# Patient Record
Sex: MALE | Race: WHITE | NOT HISPANIC OR LATINO | URBAN - METROPOLITAN AREA
[De-identification: names, ages, dates, MRNs, and addresses within clinical notes are randomized per-mention and may not be internally consistent; named-entity substitution may affect disease eponyms.]

---

## 2017-06-28 ENCOUNTER — APPOINTMENT (OUTPATIENT)
Dept: PHYSICAL THERAPY | Facility: CLINIC | Age: 77
End: 2017-06-28
Payer: MEDICARE

## 2017-06-28 PROCEDURE — G8991 OTHER PT/OT GOAL STATUS: HCPCS

## 2017-06-28 PROCEDURE — 97162 PT EVAL MOD COMPLEX 30 MIN: CPT

## 2017-06-28 PROCEDURE — 97140 MANUAL THERAPY 1/> REGIONS: CPT

## 2017-06-28 PROCEDURE — G8990 OTHER PT/OT CURRENT STATUS: HCPCS

## 2017-07-03 ENCOUNTER — APPOINTMENT (OUTPATIENT)
Dept: PHYSICAL THERAPY | Facility: CLINIC | Age: 77
End: 2017-07-03
Payer: MEDICARE

## 2017-07-03 PROCEDURE — 97110 THERAPEUTIC EXERCISES: CPT

## 2017-07-03 PROCEDURE — 97140 MANUAL THERAPY 1/> REGIONS: CPT

## 2017-07-03 PROCEDURE — 97112 NEUROMUSCULAR REEDUCATION: CPT

## 2017-07-06 ENCOUNTER — APPOINTMENT (OUTPATIENT)
Dept: PHYSICAL THERAPY | Facility: CLINIC | Age: 77
End: 2017-07-06
Payer: MEDICARE

## 2017-07-06 PROCEDURE — 97110 THERAPEUTIC EXERCISES: CPT

## 2017-07-06 PROCEDURE — 97112 NEUROMUSCULAR REEDUCATION: CPT

## 2017-07-06 PROCEDURE — 97140 MANUAL THERAPY 1/> REGIONS: CPT

## 2017-07-17 ENCOUNTER — APPOINTMENT (OUTPATIENT)
Dept: PHYSICAL THERAPY | Facility: CLINIC | Age: 77
End: 2017-07-17
Payer: MEDICARE

## 2017-07-19 ENCOUNTER — APPOINTMENT (OUTPATIENT)
Dept: PHYSICAL THERAPY | Facility: CLINIC | Age: 77
End: 2017-07-19
Payer: MEDICARE

## 2017-07-21 ENCOUNTER — APPOINTMENT (OUTPATIENT)
Dept: PHYSICAL THERAPY | Facility: CLINIC | Age: 77
End: 2017-07-21
Payer: MEDICARE

## 2017-07-21 PROCEDURE — 97140 MANUAL THERAPY 1/> REGIONS: CPT

## 2017-07-21 PROCEDURE — 97110 THERAPEUTIC EXERCISES: CPT

## 2017-07-21 PROCEDURE — 97112 NEUROMUSCULAR REEDUCATION: CPT

## 2017-07-24 ENCOUNTER — APPOINTMENT (OUTPATIENT)
Dept: PHYSICAL THERAPY | Facility: CLINIC | Age: 77
End: 2017-07-24
Payer: MEDICARE

## 2017-07-24 PROCEDURE — 97112 NEUROMUSCULAR REEDUCATION: CPT

## 2017-07-24 PROCEDURE — 97140 MANUAL THERAPY 1/> REGIONS: CPT

## 2017-07-24 PROCEDURE — 97110 THERAPEUTIC EXERCISES: CPT

## 2017-07-24 PROCEDURE — 97014 ELECTRIC STIMULATION THERAPY: CPT

## 2017-07-26 ENCOUNTER — APPOINTMENT (OUTPATIENT)
Dept: PHYSICAL THERAPY | Facility: CLINIC | Age: 77
End: 2017-07-26
Payer: MEDICARE

## 2017-07-26 PROCEDURE — G8990 OTHER PT/OT CURRENT STATUS: HCPCS

## 2017-07-26 PROCEDURE — G8991 OTHER PT/OT GOAL STATUS: HCPCS

## 2017-07-26 PROCEDURE — 97140 MANUAL THERAPY 1/> REGIONS: CPT

## 2017-07-26 PROCEDURE — 97110 THERAPEUTIC EXERCISES: CPT

## 2017-07-26 PROCEDURE — 97112 NEUROMUSCULAR REEDUCATION: CPT

## 2017-07-31 ENCOUNTER — APPOINTMENT (OUTPATIENT)
Dept: PHYSICAL THERAPY | Facility: CLINIC | Age: 77
End: 2017-07-31
Payer: MEDICARE

## 2017-07-31 PROCEDURE — 97014 ELECTRIC STIMULATION THERAPY: CPT

## 2017-07-31 PROCEDURE — 97112 NEUROMUSCULAR REEDUCATION: CPT

## 2017-07-31 PROCEDURE — 97110 THERAPEUTIC EXERCISES: CPT

## 2017-08-07 ENCOUNTER — APPOINTMENT (OUTPATIENT)
Dept: PHYSICAL THERAPY | Facility: CLINIC | Age: 77
End: 2017-08-07
Payer: MEDICARE

## 2017-08-07 PROCEDURE — 97014 ELECTRIC STIMULATION THERAPY: CPT

## 2017-08-07 PROCEDURE — 97140 MANUAL THERAPY 1/> REGIONS: CPT

## 2017-08-07 PROCEDURE — 97112 NEUROMUSCULAR REEDUCATION: CPT

## 2017-08-07 PROCEDURE — 97110 THERAPEUTIC EXERCISES: CPT

## 2017-08-09 ENCOUNTER — APPOINTMENT (OUTPATIENT)
Dept: PHYSICAL THERAPY | Facility: CLINIC | Age: 77
End: 2017-08-09
Payer: MEDICARE

## 2017-08-09 PROCEDURE — 97112 NEUROMUSCULAR REEDUCATION: CPT

## 2017-08-09 PROCEDURE — 97110 THERAPEUTIC EXERCISES: CPT

## 2017-08-14 ENCOUNTER — APPOINTMENT (OUTPATIENT)
Dept: PHYSICAL THERAPY | Facility: CLINIC | Age: 77
End: 2017-08-14
Payer: MEDICARE

## 2017-08-14 PROCEDURE — 97110 THERAPEUTIC EXERCISES: CPT

## 2017-08-14 PROCEDURE — 97112 NEUROMUSCULAR REEDUCATION: CPT

## 2017-08-16 ENCOUNTER — APPOINTMENT (OUTPATIENT)
Dept: PHYSICAL THERAPY | Facility: CLINIC | Age: 77
End: 2017-08-16
Payer: MEDICARE

## 2017-08-16 PROCEDURE — G8991 OTHER PT/OT GOAL STATUS: HCPCS

## 2017-08-16 PROCEDURE — 97112 NEUROMUSCULAR REEDUCATION: CPT

## 2017-08-16 PROCEDURE — G8992 OTHER PT/OT  D/C STATUS: HCPCS

## 2017-08-16 PROCEDURE — 97110 THERAPEUTIC EXERCISES: CPT

## 2017-10-23 ENCOUNTER — APPOINTMENT (OUTPATIENT)
Dept: PHYSICAL THERAPY | Facility: CLINIC | Age: 77
End: 2017-10-23
Payer: MEDICARE

## 2017-10-23 PROCEDURE — 97162 PT EVAL MOD COMPLEX 30 MIN: CPT

## 2017-10-23 PROCEDURE — G8990 OTHER PT/OT CURRENT STATUS: HCPCS

## 2017-10-23 PROCEDURE — 97014 ELECTRIC STIMULATION THERAPY: CPT

## 2017-10-23 PROCEDURE — G8991 OTHER PT/OT GOAL STATUS: HCPCS

## 2017-10-26 ENCOUNTER — APPOINTMENT (OUTPATIENT)
Dept: PHYSICAL THERAPY | Facility: CLINIC | Age: 77
End: 2017-10-26
Payer: MEDICARE

## 2017-10-26 PROCEDURE — 97112 NEUROMUSCULAR REEDUCATION: CPT

## 2017-10-26 PROCEDURE — 97110 THERAPEUTIC EXERCISES: CPT

## 2017-10-26 PROCEDURE — 97140 MANUAL THERAPY 1/> REGIONS: CPT

## 2017-10-30 ENCOUNTER — APPOINTMENT (OUTPATIENT)
Dept: PHYSICAL THERAPY | Facility: CLINIC | Age: 77
End: 2017-10-30
Payer: MEDICARE

## 2017-10-30 PROCEDURE — 97110 THERAPEUTIC EXERCISES: CPT

## 2017-10-30 PROCEDURE — 97112 NEUROMUSCULAR REEDUCATION: CPT

## 2017-10-30 PROCEDURE — 97140 MANUAL THERAPY 1/> REGIONS: CPT

## 2017-11-02 ENCOUNTER — APPOINTMENT (OUTPATIENT)
Dept: PHYSICAL THERAPY | Facility: CLINIC | Age: 77
End: 2017-11-02
Payer: MEDICARE

## 2017-11-02 PROCEDURE — 97112 NEUROMUSCULAR REEDUCATION: CPT

## 2017-11-02 PROCEDURE — 97014 ELECTRIC STIMULATION THERAPY: CPT

## 2017-11-02 PROCEDURE — 97110 THERAPEUTIC EXERCISES: CPT

## 2017-11-02 PROCEDURE — 97140 MANUAL THERAPY 1/> REGIONS: CPT

## 2017-11-06 ENCOUNTER — APPOINTMENT (OUTPATIENT)
Dept: PHYSICAL THERAPY | Facility: CLINIC | Age: 77
End: 2017-11-06
Payer: MEDICARE

## 2017-11-06 PROCEDURE — 97140 MANUAL THERAPY 1/> REGIONS: CPT

## 2017-11-09 ENCOUNTER — APPOINTMENT (OUTPATIENT)
Dept: PHYSICAL THERAPY | Facility: CLINIC | Age: 77
End: 2017-11-09
Payer: MEDICARE

## 2017-11-09 PROCEDURE — 97014 ELECTRIC STIMULATION THERAPY: CPT

## 2017-11-09 PROCEDURE — 97140 MANUAL THERAPY 1/> REGIONS: CPT

## 2017-11-09 PROCEDURE — 97112 NEUROMUSCULAR REEDUCATION: CPT

## 2017-11-09 PROCEDURE — 97110 THERAPEUTIC EXERCISES: CPT

## 2017-11-13 ENCOUNTER — APPOINTMENT (OUTPATIENT)
Dept: PHYSICAL THERAPY | Facility: CLINIC | Age: 77
End: 2017-11-13
Payer: MEDICARE

## 2017-11-13 PROCEDURE — 97014 ELECTRIC STIMULATION THERAPY: CPT

## 2017-11-13 PROCEDURE — 97140 MANUAL THERAPY 1/> REGIONS: CPT

## 2017-11-13 PROCEDURE — 97112 NEUROMUSCULAR REEDUCATION: CPT

## 2017-11-13 PROCEDURE — 97110 THERAPEUTIC EXERCISES: CPT

## 2017-11-16 ENCOUNTER — APPOINTMENT (OUTPATIENT)
Dept: PHYSICAL THERAPY | Facility: CLINIC | Age: 77
End: 2017-11-16
Payer: MEDICARE

## 2017-11-16 PROCEDURE — 97112 NEUROMUSCULAR REEDUCATION: CPT

## 2017-11-16 PROCEDURE — 97014 ELECTRIC STIMULATION THERAPY: CPT

## 2017-11-20 ENCOUNTER — APPOINTMENT (OUTPATIENT)
Dept: PHYSICAL THERAPY | Facility: CLINIC | Age: 77
End: 2017-11-20
Payer: MEDICARE

## 2017-11-20 PROCEDURE — 97110 THERAPEUTIC EXERCISES: CPT

## 2017-11-20 PROCEDURE — 97112 NEUROMUSCULAR REEDUCATION: CPT

## 2017-11-20 PROCEDURE — 97140 MANUAL THERAPY 1/> REGIONS: CPT

## 2017-11-27 ENCOUNTER — APPOINTMENT (OUTPATIENT)
Dept: PHYSICAL THERAPY | Facility: CLINIC | Age: 77
End: 2017-11-27
Payer: MEDICARE

## 2017-11-27 PROCEDURE — 97140 MANUAL THERAPY 1/> REGIONS: CPT

## 2017-11-27 PROCEDURE — G8990 OTHER PT/OT CURRENT STATUS: HCPCS

## 2017-11-27 PROCEDURE — G8991 OTHER PT/OT GOAL STATUS: HCPCS

## 2017-11-30 ENCOUNTER — APPOINTMENT (OUTPATIENT)
Dept: PHYSICAL THERAPY | Facility: CLINIC | Age: 77
End: 2017-11-30
Payer: MEDICARE

## 2019-01-14 ENCOUNTER — TRANSCRIBE ORDERS (OUTPATIENT)
Dept: ADMINISTRATIVE | Facility: HOSPITAL | Age: 79
End: 2019-01-14

## 2019-01-14 DIAGNOSIS — R42 VERTIGO: Primary | ICD-10-CM

## 2019-10-10 ENCOUNTER — TRANSCRIBE ORDERS (OUTPATIENT)
Dept: PHYSICAL THERAPY | Facility: CLINIC | Age: 79
End: 2019-10-10

## 2019-10-10 ENCOUNTER — EVALUATION (OUTPATIENT)
Dept: PHYSICAL THERAPY | Facility: CLINIC | Age: 79
End: 2019-10-10
Payer: MEDICARE

## 2019-10-10 DIAGNOSIS — H81.12 BENIGN PAROXYSMAL POSITIONAL VERTIGO OF LEFT EAR: Primary | ICD-10-CM

## 2019-10-10 PROCEDURE — 97162 PT EVAL MOD COMPLEX 30 MIN: CPT | Performed by: PHYSICAL THERAPIST

## 2019-10-10 PROCEDURE — 95992 CANALITH REPOSITIONING PROC: CPT | Performed by: PHYSICAL THERAPIST

## 2019-10-10 RX ORDER — ONDANSETRON 4 MG/1
4 TABLET, ORALLY DISINTEGRATING ORAL EVERY 6 HOURS PRN
COMMUNITY

## 2019-10-10 RX ORDER — LISINOPRIL 10 MG/1
10 TABLET ORAL DAILY
COMMUNITY

## 2019-10-10 NOTE — PROGRESS NOTES
PT Evaluation     Today's date: 10/10/2019  Patient name: Mayo Moore  : 1940  MRN: [id number]  Referring provider: Raoul Villela DO  Dx:   Encounter Diagnosis     ICD-10-CM    1  Benign paroxysmal positional vertigo of left ear H81 12                   Assessment  Assessment details: Patient is a 70 y o  male who presents with the above listed impairments  Patient would benefit from skilled PT services to address these impairments and to maximize function  Thank you for the referral     Impairments: abnormal coordination, activity intolerance, impaired balance and lacks appropriate home exercise program  Understanding of Dx/Px/POC: good   Prognosis: good    Goals  Impairment Goals  - pt will no longer have c/o dizziness in 2 weeks  Functional Goals  - Return to Prior Level of Function in 3 weeks  - Patient will be independent with HEP in 3 weeks    Plan  Patient would benefit from: skilled PT  Planned therapy interventions: manual therapy, neuromuscular re-education, patient education, home exercise program and canalith repositioning  Frequency: 2x week (2-3x week)  Duration in weeks: 3  Treatment plan discussed with: patient, PTA and referring physician        Subjective Evaluation    History of Present Illness  Mechanism of injury: Patient reports 10 day ago he started to experience dizziness  He reports he was prescribed Meclizine, which has helped his sxs a little  He reports that he will get dizzy when he lays down and turns to the R  He reports he has a sensation of the room spinning  Pt reports his sxs will last ~15 seconds  He states he has been trying to self treat at home, without success  He does not nausea when he becomes dizziness      Occupation: Retired  PLOF: Independent   Pt's Goals:  Pain  Current pain ratin  At best pain ratin  At worst pain ratin  Location: NA    Patient Goals  Patient goal: return to PLOF        Objective     Ambulation     Comments Oculomotor Screen   -Smooth Pursuits- neg  Gaze holding nystagmus  Spontaneous nystagmus room light-   -[place] Convergence- 8 cm  -Saccades- neg  -VOR Screen-  neg  -VOR Cancel-  neg  -Head Thrust-  neg     Coordination Screen-   -Dysmetria - neg  -Dysdiadochokinesia - neg  -Alternating Toe Taps      Positional Testing  -[place]-Hallpike- neg  On R, + on L, sxs last for 20-30 seconds with nystagmus noted  -Roll Test- NT    C/s screen was negative                 Diagnosis: L BPPV   Precautions: -   Manuals 10/10       L CRT x2 pt vomitted after second and did not complete full CRT on second                               Exercise Diary                                                                                                                                                                                Modalities             NA

## 2019-10-10 NOTE — LETTER
October 10, 2019    Shiva Marcano, [address]  [place]    Patient: Norah Mcconnell   YOB: 1940   Date of Visit: 10/10/2019     Encounter Diagnosis     ICD-10-CM    1  Benign paroxysmal positional vertigo of left ear H81 12        Dear Dr Ken Petit: Thank you for your recent referral of Norah Mcconnell  Please review the attached evaluation summary from Christopher's recent visit  Please verify that you agree with the plan of care by signing the attached order  If you have any questions or concerns, please do not hesitate to call  I sincerely appreciate the opportunity to share in the care of one of your patients and hope to have another opportunity to work with you in the near future  Sincerely,    Roanna Harada, PT      Referring Provider:      I certify that I have read the below Plan of Care and certify the need for these services furnished under this plan of treatment while under my care  Shiva Marcano DO  [address]  [place]  VIA Facsimile: [phone]          PT Evaluation     Today's date: 10/10/2019  Patient name: Norah Mcconnell  : 1940  MRN: [id number]  Referring provider: Bryan Kenny DO  Dx:   Encounter Diagnosis     ICD-10-CM    1  Benign paroxysmal positional vertigo of left ear H81 12                   Assessment  Assessment details: Patient is a 70 y o  male who presents with the above listed impairments  Patient would benefit from skilled PT services to address these impairments and to maximize function  Thank you for the referral     Impairments: abnormal coordination, activity intolerance, impaired balance and lacks appropriate home exercise program  Understanding of Dx/Px/POC: good   Prognosis: good    Goals  Impairment Goals  - pt will no longer have c/o dizziness in 2 weeks      Functional Goals  - Return to Prior Level of Function in 3 weeks  - Patient will be independent with HEP in 3 weeks    Plan  Patient would benefit from: skilled PT  Planned therapy interventions: manual therapy, neuromuscular re-education, patient education, home exercise program and canalith repositioning  Frequency: 2x week (2-3x week)  Duration in weeks: 3  Treatment plan discussed with: patient, PTA and referring physician        Subjective Evaluation    History of Present Illness  Mechanism of injury: Patient reports 10 day ago he started to experience dizziness  He reports he was prescribed Meclizine, which has helped his sxs a little  He reports that he will get dizzy when he lays down and turns to the R  He reports he has a sensation of the room spinning  Pt reports his sxs will last ~15 seconds  He states he has been trying to self treat at home, without success  He does not nausea when he becomes dizziness  Occupation: Retired  PLOF: Independent   Pt's Goals:  Pain  Current pain ratin  At best pain ratin  At worst pain ratin  Location: NA    Patient Goals  Patient goal: return to PLOF        Objective     Ambulation     Comments   Oculomotor Screen   -Smooth Pursuits- neg  Gaze holding nystagmus  Spontaneous nystagmus room light-   -[place] Convergence- 8 cm  -Saccades- neg  -VOR Screen-  neg  -VOR Cancel-  neg  -Head Thrust-  neg     Coordination Screen-   -Dysmetria - neg  -Dysdiadochokinesia - neg  -Alternating Toe Taps      Positional Testing  -[place]-Hallpike- neg  On R, + on L, sxs last for 20-30 seconds with nystagmus noted  -Roll Test- NT    C/s screen was negative                 Diagnosis: L BPPV   Precautions: -   Manuals 10/10       L CRT x2 pt vomitted after second and did not complete full CRT on second                               Exercise Diary                                                                                                                                                                                Modalities             NA

## 2019-10-11 ENCOUNTER — EVALUATION (OUTPATIENT)
Dept: PHYSICAL THERAPY | Facility: CLINIC | Age: 79
End: 2019-10-11
Payer: MEDICARE

## 2019-10-11 DIAGNOSIS — H81.12 BENIGN PAROXYSMAL POSITIONAL VERTIGO OF LEFT EAR: Primary | ICD-10-CM

## 2019-10-11 PROCEDURE — 97140 MANUAL THERAPY 1/> REGIONS: CPT | Performed by: PHYSICAL THERAPIST

## 2019-10-11 PROCEDURE — 97530 THERAPEUTIC ACTIVITIES: CPT | Performed by: PHYSICAL THERAPIST

## 2019-10-11 NOTE — PROGRESS NOTES
PT Re-Evaluation     Today's date: 10/11/2019  Patient name: Arlene Courser  : 1940  MRN: [id number]  Referring provider: Edu Fox DO  Dx:   Encounter Diagnosis     ICD-10-CM    1  Benign paroxysmal positional vertigo of left ear H81 12                   Assessment  Assessment details: Patient is a 70 y o  male who presents with left BPPV lasting 20-30 seconds with initial positional testing  Left CRT was conducted 3 times with overall reduction in symptoms but held at 3 reps due to increased nausea and patient request  No vomiting this date  Education about instability after treatment session can last 24 hours  Patient had out issued about BPPV  Patient will benefit from skilled PT to improve function and return to PLOF of symptom free  Thank you for the referral     Impairments: abnormal coordination, activity intolerance, impaired balance and lacks appropriate home exercise program  Understanding of Dx/Px/POC: good   Prognosis: good    Goals  Impairment Goals  - pt will no longer have c/o dizziness in 2 weeks  Functional Goals  - Return to Prior Level of Function in 3 weeks  - Patient will be independent with HEP in 3 weeks    Plan  Patient would benefit from: skilled PT  Planned therapy interventions: manual therapy, neuromuscular re-education, patient education, home exercise program and canalith repositioning  Frequency: 2x week (2-3x week)  Duration in weeks: 3  Treatment plan discussed with: patient and referring physician        Subjective Evaluation    History of Present Illness  Mechanism of injury: Has history of BPPV with crystal and inner ear issue on left side a few years ago  Reports last episode was within a year and most recent occurrence Wed  Has history of injury 20 years ago  Has history of hypotension  Was referred from [facility] PT due to adverse occurrence during initial evaluation  Some  Nausea at times  when he becomes dizziness      Occupation: Retired  PLOF: Independent   Pt's Goals: get rid of this dizziness   Pain  Current pain ratin  At best pain ratin  At worst pain ratin  Location: NA    Patient Goals  Patient goal: return to PLOF        Objective     Ambulation     Comments   Oculomotor Screen   -Smooth Pursuits- neg  Gaze holding nystagmus  Spontaneous nystagmus room light-   -[place] Convergence- 8 cm  -Saccades- neg  -VOR Screen-  neg  -VOR Cancel-  neg  -Head Thrust-  neg     Coordination Screen-   -Dysmetria - neg  -Dysdiadochokinesia - neg  -Alternating Toe Taps      Positional Testing  -[place]-Hallpike- neg  On R, + on L, sxs last for 20-30 seconds with nystagmus    -Roll Test- NT    C/s screen was negative                 Diagnosis: L BPPV   Precautions: -   Manuals 10/10       L CRT x3 pt vomitted with reduction in symptoms                                Exercise Diary                                                                                                                                                                                Modalities             NA

## 2019-10-11 NOTE — LETTER
2019    Wes Velasquez, [address]  [place]    Patient: Luis Grace   YOB: 1940   Date of Visit: 10/11/2019     Encounter Diagnosis     ICD-10-CM    1  Benign paroxysmal positional vertigo of left ear H81 12        Dear Dr Ramona Kirby: Thank you for your recent referral of Luis Grace  Please review the attached evaluation summary from Christopher's recent visit  Please verify that you agree with the plan of care by signing the attached order  If you have any questions or concerns, please do not hesitate to call  I sincerely appreciate the opportunity to share in the care of one of your patients and hope to have another opportunity to work with you in the near future  Sincerely,    Hill Mendes, PT      Referring Provider:      I certify that I have read the below Plan of Care and certify the need for these services furnished under this plan of treatment while under my care  Wes Velasquez DO  [address]: [phone]          PT Re-Evaluation     Today's date: 10/11/2019  Patient name: Luis Grace  : 1940  MRN: [id number]  Referring provider: Shahriar Palacios DO  Dx:   Encounter Diagnosis     ICD-10-CM    1  Benign paroxysmal positional vertigo of left ear H81 12                   Assessment  Assessment details: Patient is a 70 y o  male who presents with left BPPV lasting 20-30 seconds with initial positional testing  Left CRT was conducted 3 times with overall reduction in symptoms but held at 3 reps due to increased nausea and patient request  No vomiting this date  Education about instability after treatment session can last 24 hours  Patient had out issued about BPPV  Patient will benefit from skilled PT to improve function and return to OF of symptom free     Thank you for the referral     Impairments: abnormal coordination, activity intolerance, impaired balance and lacks appropriate home exercise program  Understanding of Dx/Px/POC: good   Prognosis: good    Goals  Impairment Goals  - pt will no longer have c/o dizziness in 2 weeks  Functional Goals  - Return to Prior Level of Function in 3 weeks  - Patient will be independent with HEP in 3 weeks    Plan  Patient would benefit from: skilled PT  Planned therapy interventions: manual therapy, neuromuscular re-education, patient education, home exercise program and canalith repositioning  Frequency: 2x week (2-3x week)  Duration in weeks: 3  Treatment plan discussed with: patient and referring physician        Subjective Evaluation    History of Present Illness  Mechanism of injury: Has history of BPPV with crystal and inner ear issue on left side a few years ago  Reports last episode was within a year and most recent occurrence Wed  Has history of injury 20 years ago  Has history of hypotension  Was referred from [facility] PT due to adverse occurrence during initial evaluation  Some  Nausea at times  when he becomes dizziness  Occupation: Retired  PLOF: Independent   Pt's Goals: get rid of this dizziness   Pain  Current pain ratin  At best pain ratin  At worst pain ratin  Location: NA    Patient Goals  Patient goal: return to PLOF        Objective     Ambulation     Comments   Oculomotor Screen   -Smooth Pursuits- neg  Gaze holding nystagmus  Spontaneous nystagmus room light-   -[place] Convergence- 8 cm  -Saccades- neg  -VOR Screen-  neg  -VOR Cancel-  neg  -Head Thrust-  neg     Coordination Screen-   -Dysmetria - neg  -Dysdiadochokinesia - neg  -Alternating Toe Taps      Positional Testing  -[place]-Hallpike- neg  On R, + on L, sxs last for 20-30 seconds with nystagmus    -Roll Test- NT    C/s screen was negative                 Diagnosis: L BPPV   Precautions: -   Manuals 10/10       L CRT x3 pt vomitted with reduction in symptoms                                Exercise Diary                                                         Modalities             NA

## 2019-10-14 ENCOUNTER — TRANSCRIBE ORDERS (OUTPATIENT)
Dept: PHYSICAL THERAPY | Facility: CLINIC | Age: 79
End: 2019-10-14

## 2019-10-14 ENCOUNTER — OFFICE VISIT (OUTPATIENT)
Dept: PHYSICAL THERAPY | Facility: CLINIC | Age: 79
End: 2019-10-14
Payer: MEDICARE

## 2019-10-14 DIAGNOSIS — H81.12 BENIGN PAROXYSMAL POSITIONAL VERTIGO OF LEFT EAR: Primary | ICD-10-CM

## 2019-10-14 PROCEDURE — 97140 MANUAL THERAPY 1/> REGIONS: CPT | Performed by: PHYSICAL THERAPIST

## 2019-10-14 NOTE — PROGRESS NOTES
Daily Note     Today's date: 10/14/2019  Patient name: Cameron Wray  : 1940  MRN: [id number]  Referring provider: Aida Dos Santos DO  Dx:   Encounter Diagnosis     ICD-10-CM    1  Benign paroxysmal positional vertigo of left ear H81 12                   Subjective: Pt reports feeling unsteady rest of the day Friday and half the day on Saturday  With some nausea on Saturday  Felt great yesterday and feels a little nausea this am  Every time I got in a car I get motion sensitivity  Objective: See treatment diary below    [place]-Hallpike testing: Negative Right side, Positive Left Side for 8-9 seconds  Roll test BL negative  CRT: 3 reps  Left side conducted initial 9 seconds and 2 seconds with rolling  Assessment: Tolerated treatment well with 3 CRT  overall noted reduction in symptoms with last rep of only  5 to 7 seconds with slow rotation  If continues will attempt summont next session  Patient would benefit from continued PT      Plan: Continue per plan of care        Diagnosis: L BPPV   Precautions: -   Manuals 10/10       L CRT x3 pt vomitted with reduction in symptoms                                Exercise Diary                                                                                                                                                                                Modalities             NA

## 2019-10-15 ENCOUNTER — OFFICE VISIT (OUTPATIENT)
Dept: PHYSICAL THERAPY | Facility: CLINIC | Age: 79
End: 2019-10-15
Payer: MEDICARE

## 2019-10-15 DIAGNOSIS — H81.12 BENIGN PAROXYSMAL POSITIONAL VERTIGO OF LEFT EAR: Primary | ICD-10-CM

## 2019-10-15 PROCEDURE — 97530 THERAPEUTIC ACTIVITIES: CPT | Performed by: PHYSICAL THERAPIST

## 2019-10-15 PROCEDURE — 97112 NEUROMUSCULAR REEDUCATION: CPT | Performed by: PHYSICAL THERAPIST

## 2019-10-15 NOTE — PROGRESS NOTES
Daily Note     Today's date: 10/15/2019  Patient name: Ada Barber  : 1940  MRN: [id number]  Referring provider: Yolande Conrad DO  Dx:   Encounter Diagnosis     ICD-10-CM    1  Benign paroxysmal positional vertigo of left ear H81 12                   Subjective: Pt reports he drove home yesterday after PT appt , and he did ok  He has baseline nausea  No reports of spinning dizziness, but feels slightly wobbly  Woke up this morning w/ optical migraine, took tylenol and drank coffee  Objective: See treatment diary below    Positional testing:  Roll test (-) to the R  Roll test (+) to the L, upbeat torsional nystagmus lasting about 10 seconds     [place]-Hallpike (-) to the R  [place]-Hallpike (+) to the L, upbeat torsional nystagmus lasting about 10 seconds      Semont maneuver: performed 3x  Assessment: Pt tolerated treatment well  Decreased symptoms w/ positional testing today but nystagmus still noted  He tolerated Semont well w/ minimal symptom provocation  Pt would benefit from skilled PT to promote symptom-free function  Follow-up appt Friday to recheck positionals  Plan: Continue per plan of care 
Abdomen soft, nontender, nondistended, bowel sounds present in all 4 quadrants.

## 2019-10-18 ENCOUNTER — OFFICE VISIT (OUTPATIENT)
Dept: PHYSICAL THERAPY | Facility: CLINIC | Age: 79
End: 2019-10-18
Payer: MEDICARE

## 2019-10-18 DIAGNOSIS — H81.12 BENIGN PAROXYSMAL POSITIONAL VERTIGO OF LEFT EAR: Primary | ICD-10-CM

## 2019-10-18 PROCEDURE — 97140 MANUAL THERAPY 1/> REGIONS: CPT | Performed by: PHYSICAL THERAPIST

## 2019-10-18 NOTE — PROGRESS NOTES
Daily Note     Today's date: 10/18/2019  Patient name: Carlos Anthony  : 1940  MRN: [id number]  Referring provider: Crow Farley DO  Dx:   Encounter Diagnosis     ICD-10-CM    1  Benign paroxysmal positional vertigo of left ear H81 12                   Subjective: Pt reports spinning upon waking this am  Feels unsteady but doing well        Objective: See treatment diary below    Positional testing:  Roll test Negative BL   [place]-Hallpike Negative BL conducted 3 times held for 60 sec, 90 sec and 60 sec     Oculomotor Screen   Dizziness 0 / 10  Nausea 0/ 10    -Smooth Pursuits- Central   Normal, Dizziness 0/10    -Gaze holding nystagmus-   Normal, Dizziness 0/10    -Spontaneous nystagmus room light-  Normal, Dizziness 0/10    -Near Point Convergence- Central   Normal, 3 Inches/CM ( 4 inch/ 6CM norm)   Dizziness 0/10    -Saccades- Central   Horizontal   Normal, Dizziness 0/10  Vertical   Normal, Dizziness 0/10    -VOR Screen / Motion Sensitivity-   Horizontal   Normal, Dizziness 0/10  Vertical   Normal, Dizziness 0/10    -VOR Cancel- Central   Horizontal   Normal, Dizziness 0/10  Vertical   Normal, Dizziness 0/10      -Head Thrust-  Horizontal  Normal,bilateral, Dizziness 0/10    Standing VOR x 1: dizzy 1/10  FTEC increased sway firm surface 30 seconds no loss of balance       Assessment: Pt tolerated treatment well  No provocation of symptoms with positional testing  Issued VOR x 1 and FTEC 30 secs as HEP  Reassessment later half of next week  Advised patient to track symptoms  Plan: Continue per plan of care

## 2019-10-24 ENCOUNTER — OFFICE VISIT (OUTPATIENT)
Dept: PHYSICAL THERAPY | Facility: CLINIC | Age: 79
End: 2019-10-24
Payer: MEDICARE

## 2019-10-24 DIAGNOSIS — H81.12 BENIGN PAROXYSMAL POSITIONAL VERTIGO OF LEFT EAR: Primary | ICD-10-CM

## 2019-10-24 PROCEDURE — 97112 NEUROMUSCULAR REEDUCATION: CPT | Performed by: PHYSICAL THERAPIST

## 2019-10-24 NOTE — PROGRESS NOTES
Discharge     Today's date: 10/24/2019  Patient name: Carlos Anthony  : 1940  MRN: [id number]  Referring provider: Crow Farley DO  Dx:   Encounter Diagnosis     ICD-10-CM    1  Benign paroxysmal positional vertigo of left ear H81 12        Start Time: 1015  Stop Time: 1100  Total time in clinic (min): 45 minutes    Subjective: Pt reports no dizziness or spinning sensation since last being seen  Objective: See treatment diary below  Goals  Impairment Goals  - pt will no longer have c/o dizziness in 2 weeks  MET    Functional Goals  - Return to Prior Level of Function in 3 weeks MET  - Patient will be independent with HEP in 3 weeks MET    Positional testing:  Negative  Reviewed VOR x 1 and FTEC exercise with no issues with performance or dizziness  Assessment: Pt symptom free with testing  Reviewed HEP exercises noted above  Reviewed educational handout of BPPV and frequency etc  Patient questions answered  Patient agreeable to DC at this time   All goals MET

## 2020-08-12 ENCOUNTER — TRANSCRIBE ORDERS (OUTPATIENT)
Dept: PHYSICAL THERAPY | Facility: CLINIC | Age: 80
End: 2020-08-12

## 2020-08-12 ENCOUNTER — EVALUATION (OUTPATIENT)
Dept: PHYSICAL THERAPY | Facility: CLINIC | Age: 80
End: 2020-08-12
Payer: MEDICARE

## 2020-08-12 DIAGNOSIS — R42 DIZZINESS: Primary | ICD-10-CM

## 2020-08-12 PROCEDURE — 97163 PT EVAL HIGH COMPLEX 45 MIN: CPT | Performed by: PHYSICAL THERAPIST

## 2020-08-12 NOTE — LETTER
2020    Nohemy Wild MD  [facility] [address]  [address]  [facility] [address]    Patient: Macie Shelby   YOB: 1940   Date of Visit: 2020     Encounter Diagnosis     ICD-10-CM    1  Alfred Poncho        Dear Dr Ashley Hill:    Thank you for your recent referral of Macie Shelby  Please review the attached evaluation summary from Christopher's recent visit  Please verify that you agree with the plan of care by signing the attached order  If you have any questions or concerns, please do not hesitate to call  I sincerely appreciate the opportunity to share in the care of one of your patients and hope to have another opportunity to work with you in the near future  Sincerely,    Nayana Yang, PT      Referring Provider:      I certify that I have read the below Plan of Care and certify the need for these services furnished under this plan of treatment while under my care  Nohemy Wild MD  [facility] [phone] [id number] [address]: [phone]          PT Evaluation     Today's date: 2020  Patient name: Macie Shelby  : 1940  MRN: [id number]  Referring provider: Rosalio Neff MD  Dx:   Encounter Diagnosis     ICD-10-CM    1  Dizziness  R42                   Assessment  Assessment details: Patient is a 68 y o  male who presents with left horizontal BPPV lasting 47 seconds with initial positional testing  [place] maneuver for left side BPPV was conducted 3 times with overall reduction in symptoms but held at 3 reps due to increased nausea and patient request  No vomiting this date  Education about instability after treatment session can last 24 hours  Patient had out issued about BPPV  Patient will benefit from skilled PT to improve function and return to OF of symptom free     Thank you for the referral     Impairments: abnormal coordination, activity intolerance, impaired balance and lacks appropriate home exercise program  Understanding of Dx/Px/POC: good   Prognosis: good    Goals  Impairment Goals  - pt will no longer have c/o dizziness in 2 weeks  Functional Goals  - Return to Prior Level of Function in 3 weeks  - Patient will be independent with HEP in 3 weeks    Plan  Patient would benefit from: skilled PT  Planned therapy interventions: manual therapy, neuromuscular re-education, patient education, home exercise program and canalith repositioning  Frequency: 2x week (2-3x week)  Duration in weeks: 4  Treatment plan discussed with: patient and referring physician        Subjective Evaluation    History of Present Illness  Mechanism of injury: Has history of BPPV with crystal and inner ear issue on left side a few years ago  Was treated at this location last year  Current presents to skilled PT for BPPV again due to same issue occurring  Notes dizziness starting last week when golfing  Has had a few episodes since than  Was wiped out most of the day yesterday  Took meclizine which did not help as much  Notes having vomiting yesterday  Has history of injury 20 years ago  Has history of hypotension  Some  Nausea at times  when he becomes dizziness      Occupation: Retired  PLOF: Independent   Pt's Goals: get rid of this dizziness   Pain  Current pain ratin  At best pain ratin  At worst pain ratin  Location: NA    Patient Goals  Patient goal: return to PLOF        Objective     Ambulation     Comments         Functional Assessment        Comments  Oculomotor Screen   Dizziness 0 / 10     -Positional Testing-  -[place]-Hallpike-   Right:Normal, Nystagmus  no, 0 seconds   Left: Abnormal,  Nystagmus  yes, 11 seconds   -Roll Test-   Right: Normal, Nystagmus  no 0 seconds  Left: Abnormal,  Nystagmus  yes, 47 seconds         Flowsheet Rows      Most Recent Value   PT/OT G-Codes   Current Score  49   Projected Score  81             Diagnosis: L BPPV   Precautions: -   Manuals 10/10       L CRT x3 pt vomitted with reduction in symptoms                                Exercise Diary                                                                                                                                                                                Modalities             NA
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
## 2020-08-13 ENCOUNTER — OFFICE VISIT (OUTPATIENT)
Dept: PHYSICAL THERAPY | Facility: CLINIC | Age: 80
End: 2020-08-13
Payer: MEDICARE

## 2020-08-13 DIAGNOSIS — R42 DIZZINESS: Primary | ICD-10-CM

## 2020-08-13 PROCEDURE — 97112 NEUROMUSCULAR REEDUCATION: CPT

## 2020-08-13 NOTE — PROGRESS NOTES
Daily Note     Today's date: 2020  Patient name: Leanne Le  : 1940  MRN: [id number]  Referring provider: Catarino Jane MD  Dx:   Encounter Diagnosis     ICD-10-CM    1  Dizziness  R42        Start Time:   Stop Time:   Total time in clinic (min): 45 minutes    Subjective: reports that he felt fatigued after last session but his dizziness improved  He got up to go to bathroom in the middle of the night and his room spinning dizziness began again as well as nausea  Objective: See treatment diary below    [employer]: (+) L ; (-) R  Supine Roll test: (+) L; (-)R      Assessment: Based on objective findings, completed CRT for (+)L four times  Addressed posterior canal as patient was more symptomatic with Renata Bores to L  Patient experienced reduction in nausea and no dizziness by end of session  Plan: Continue per plan of care        Diagnosis: L BPPV   Precautions: -   Manuals 10/10 8/13/20      L CRT x3 pt vomitted with reduction in symptoms  x4 pt had  reduction in symptoms                               Exercise Diary                                                                                                                                                                                Modalities             NA

## 2020-08-17 ENCOUNTER — OFFICE VISIT (OUTPATIENT)
Dept: PHYSICAL THERAPY | Facility: CLINIC | Age: 80
End: 2020-08-17
Payer: MEDICARE

## 2020-08-17 DIAGNOSIS — R42 DIZZINESS: Primary | ICD-10-CM

## 2020-08-17 PROCEDURE — 97112 NEUROMUSCULAR REEDUCATION: CPT | Performed by: PHYSICAL THERAPIST

## 2020-08-17 NOTE — PROGRESS NOTES
Daily Note     Today's date: 2020  Patient name: Lien Burroughs  : 1940  MRN: [id number]  Referring provider: Celena Desir MD  Dx:   Encounter Diagnosis     ICD-10-CM    1  Dizziness  R42        Start Time: 28  Stop Time:   Total time in clinic (min): 45 minutes    Subjective: client reports 1/10 dizziness at this time  Objective: See treatment diary below    [facility]: (+) L: torsional nystagmus lasting 1 min 12 sec ; (-) R   Supine Roll test: (+) L  torsional nystagmus lasting approx 26 sec; (-)R    L Semont with (+) reports of 1/10 dizziness  L Epley:  Trial 1: 1 min nystagmus  Trial 2: 1 min 12 sec nystagmus  Trial 3: 53 secs nystagmus    Assessment: Based on objective findings, completed CRT for (+)L four times  Addressed posterior canal as patient was more symptomatic with [facility] to L  Patient experienced reduction in nausea and no dizziness by end of session  Plan: Continue per plan of care        Diagnosis: L BPPV   Precautions: -   Manuals 10/10 8/13/20      L CRT x3 pt vomitted with reduction in symptoms  x4 pt had  reduction in symptoms                               Exercise Diary                                                                                                                                                                                Modalities             NA

## 2020-08-18 ENCOUNTER — OFFICE VISIT (OUTPATIENT)
Dept: PHYSICAL THERAPY | Facility: CLINIC | Age: 80
End: 2020-08-18
Payer: MEDICARE

## 2020-08-18 DIAGNOSIS — R42 DIZZINESS: Primary | ICD-10-CM

## 2020-08-18 PROCEDURE — 97140 MANUAL THERAPY 1/> REGIONS: CPT | Performed by: PHYSICAL THERAPIST

## 2020-08-18 PROCEDURE — 97112 NEUROMUSCULAR REEDUCATION: CPT | Performed by: PHYSICAL THERAPIST

## 2020-08-18 NOTE — PROGRESS NOTES
Daily Note     Today's date: 2020  Patient name: Jackeline Blount  : 1940  MRN: [id number]  Referring provider: Boni Blair MD  Dx:   Encounter Diagnosis     ICD-10-CM    1  Dizziness  R42                   Subjective: Pt reports 1/10 dizziness upon waking this am  Notes having some spinning last night but went away when getting up this morning  Objective: See treatment diary below    [place]-Hallpike: BL Negative: testing 3 times   Roll testing : BL Negative: testing 3 times     VOR x 1: Plain surround 60 seconds with turning head to saying word " Turn "   H: 1st set 2/10 ( A from PT) ; 2nd set 2/10   V: 1st set 1/10, 2nd set 1/10     Gait with head turns: 20 feet turning head every 2 steps   H: 2/10 5 cycles   V: 1/10 5 cycles       Assessment: Increased time for positional testing with negative findings for all positions  No dizziness, did require increased time to relax neck to achieve proper alignment and posture  Issued HEP of VOR x 1 and Gait with head turns  Noted increase in overall dizziness to 2/10 per report post VOR x 1 at 60 seconds  Required facilitation to obtain proper speed and range with initial VOR  Re-assess positionals  Plan: Continue per plan of care   Re-assess positionals

## 2020-08-21 ENCOUNTER — OFFICE VISIT (OUTPATIENT)
Dept: PHYSICAL THERAPY | Facility: CLINIC | Age: 80
End: 2020-08-21
Payer: MEDICARE

## 2020-08-21 DIAGNOSIS — R42 DIZZINESS: Primary | ICD-10-CM

## 2020-08-21 PROCEDURE — 97112 NEUROMUSCULAR REEDUCATION: CPT | Performed by: PHYSICAL THERAPIST

## 2020-08-21 NOTE — PROGRESS NOTES
Daily Note     Today's date: 2020  Patient name: Dung St  : 1940  MRN: [id number]  Referring provider: Dina Camilo MD  Dx:   Encounter Diagnosis     ICD-10-CM    1  Dizziness  R42                   Subjective: Pt reports no dizziness today       Objective: See treatment diary below    [place]iv[place]-Hallpike L: + with semont     Semont conducted 5 times     VOR x 1: Plain surround 60 seconds with turning head to saying word " Turn "   H: 1st set 2/10 ( A from PT) ; 2nd set 2/10   V: 1st set 1/10, 2nd set 1/10     Gait with head turns: 20 feet turning head every 2 steps   H: 2/10 5 cycles   V: 1/10 5 cycles       Assessment: + left rivka-hallpike  semont conducted 5 times with no nystagmus with re-test after 5th attempt  Continue to re-assess     Plan: Continue per plan of care   Re-assess positionals

## 2020-08-24 ENCOUNTER — OFFICE VISIT (OUTPATIENT)
Dept: PHYSICAL THERAPY | Facility: CLINIC | Age: 80
End: 2020-08-24
Payer: MEDICARE

## 2020-08-24 DIAGNOSIS — R42 DIZZINESS: Primary | ICD-10-CM

## 2020-08-24 PROCEDURE — 97112 NEUROMUSCULAR REEDUCATION: CPT | Performed by: PHYSICAL THERAPIST

## 2020-08-24 NOTE — PROGRESS NOTES
Daily Note     Today's date: 2020  Patient name: Kristen Peters  : 1940  MRN: [id number]  Referring provider: Charmayne Earing, MD  Dx:   Encounter Diagnosis     ICD-10-CM    1  Dizziness  R42                   Subjective: Pt reports no dizziness today, conducted exercise brand 4 times on        Objective: See treatment diary below    Rivka-hallpike Negative x 3 reps  Roll test: Negative x 3 reps     Tandem walking 30 feet 2 times   FTEC 30 sec x 3 reps   FTEC head turns 10 reps side to side and up and down   Gait with head turns   VOR x 1 2 minutes " saying word turn"    Assessment: negative for positional testing this date  Updated HEP for vest/balance exercises  Patient will be placed on hold for 2 weeks with understanding for DC after that point       Plan: Hold

## 2020-09-03 ENCOUNTER — EVALUATION (OUTPATIENT)
Dept: PHYSICAL THERAPY | Facility: CLINIC | Age: 80
End: 2020-09-03
Payer: MEDICARE

## 2020-09-03 DIAGNOSIS — R42 DIZZINESS: Primary | ICD-10-CM

## 2020-09-03 PROCEDURE — 97112 NEUROMUSCULAR REEDUCATION: CPT

## 2020-09-03 RX ORDER — MECLIZINE HYDROCHLORIDE CHEWABLE TABLETS 25 MG/1
25 TABLET, CHEWABLE ORAL AS NEEDED
COMMUNITY
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
## 2020-09-14 ENCOUNTER — APPOINTMENT (OUTPATIENT)
Dept: PHYSICAL THERAPY | Facility: CLINIC | Age: 80
End: 2020-09-14
Payer: MEDICARE

## 2020-09-15 ENCOUNTER — TRANSCRIBE ORDERS (OUTPATIENT)
Dept: PHYSICAL THERAPY | Facility: CLINIC | Age: 80
End: 2020-09-15

## 2020-09-15 DIAGNOSIS — R42 DIZZINESS: Primary | ICD-10-CM

## 2020-09-16 ENCOUNTER — OFFICE VISIT (OUTPATIENT)
Dept: PHYSICAL THERAPY | Facility: CLINIC | Age: 80
End: 2020-09-16
Payer: MEDICARE

## 2020-09-16 DIAGNOSIS — R42 DIZZINESS: Primary | ICD-10-CM

## 2020-09-16 PROCEDURE — 97110 THERAPEUTIC EXERCISES: CPT | Performed by: PHYSICAL THERAPIST

## 2020-09-16 PROCEDURE — 97140 MANUAL THERAPY 1/> REGIONS: CPT | Performed by: PHYSICAL THERAPIST

## 2020-09-16 NOTE — PROGRESS NOTES
Daily Note     Today's date: 2020  Patient name: Gabe Vargas  : 1940  MRN: [id number]  Referring provider: Mehdi Morales MD  Dx:   Encounter Diagnosis     ICD-10-CM    1  Dizziness  R42                   Subjective: Pt appears motivated to participate in PT  Objective: See treatment diary below    Heat 5 minutes  Manuals (15 minutes):   -Trigger point release to upper trapezius and levator scap B/L    TE:  -Upper trapezius stretch 3 x 30 seconds (each direction)  -Levator scap stretch 3 x 30 seconds (each direction)  -Pec doorway stretch 3 x 30 seconds (each direction)  -Scap retractions 2 x 10 with 5 second hold  -Chin tucks 2 x 10 with 3 second hold      Assessment: Pt tolerated treatment well  Pt reported improvement in pain and tightness in upper trap and levator scap region following manual  Pt requires more manual on L secondary to increased tightness and trigger points  Pt was educated on HEP (see TE section above) and demonstrated a good understanding of all exercises to be performed at home  Patient would benefit from continued PT to reduce dizziness and pain in the neck region to improve overall participation in daily roles at home and in the community  Plan: Continue per plan of care  Educate patient on postural resistance exercises for rows, arm extensions, and horizontal abduction to continue to strength postural muscles

## 2020-09-17 ENCOUNTER — OFFICE VISIT (OUTPATIENT)
Dept: PHYSICAL THERAPY | Facility: CLINIC | Age: 80
End: 2020-09-17
Payer: MEDICARE

## 2020-09-17 DIAGNOSIS — R42 DIZZINESS: Primary | ICD-10-CM

## 2020-09-17 PROCEDURE — 97110 THERAPEUTIC EXERCISES: CPT

## 2020-09-17 PROCEDURE — 97140 MANUAL THERAPY 1/> REGIONS: CPT

## 2020-09-17 NOTE — PROGRESS NOTES
Daily Note     Today's date: 2020  Patient name: Miquel Ly  : 1940  MRN: [id number]  Referring provider: King Kruger MD  Dx:   Encounter Diagnosis     ICD-10-CM    1  Dizziness  R42        Start Time:   Stop Time: 467  Total time in clinic (min): 50 minutes    Subjective: Feels like a drunken  when he plays golf in the mornings  Objective: See treatment diary below    NP Heat 5 minutes  Manuals (16 minutes):   -Trigger point release to upper trapezius and levator scap B/L 4 times each side  --Pt was instructed in use of and trialed Back Buddy for self-TPR, successfully  TE:30min  -Upper trapezius stretch 3 x 30 seconds (each direction)  -Levator scap stretch 3 x 30 seconds (each direction) with foam half roll behind pt, seated  -NP Pec doorway stretch 3 x 30 seconds (each direction)  -green TBScap retractions 2 x 10 with 5 second hold  -Chin tucks 2 x 10 with 3 second hold    HEP green TB issued 20  Pt was advised to perform cervical stretching 2-3 times daily  Assessment: Pt tolerated treatment well  Time spent educating pt regarding self-TPR and trial of BackBuddy, successfully  TB initiated for scap retractions, green TB issued  Pt continues to report improvement in movement of cervical spine  Pt was provided info on BackBuddy/Back Gaye to order at home  Patient would benefit from continued PT to reduce dizziness and pain in the neck region to improve overall participation in daily roles at home and in the community  Plan: Continue per plan of care  Educate patient on postural resistance exercises for rows, arm extensions, and horizontal abduction to continue to strength postural muscles

## 2020-09-21 ENCOUNTER — OFFICE VISIT (OUTPATIENT)
Dept: PHYSICAL THERAPY | Facility: CLINIC | Age: 80
End: 2020-09-21
Payer: MEDICARE

## 2020-09-21 DIAGNOSIS — R42 DIZZINESS: Primary | ICD-10-CM

## 2020-09-21 PROCEDURE — 97140 MANUAL THERAPY 1/> REGIONS: CPT | Performed by: PHYSICAL THERAPIST

## 2020-09-21 PROCEDURE — 97110 THERAPEUTIC EXERCISES: CPT | Performed by: PHYSICAL THERAPIST

## 2020-09-21 NOTE — PROGRESS NOTES
Daily Note    Last IE/ MT : 9/3  Visit: 3/10     Today's date: 2020  Patient name: Jared Angeles  : 1940  MRN: [id number]  Referring provider: Radha Torres MD  Dx:   Encounter Diagnosis     ICD-10-CM    1  Dizziness  R42                   Subjective: Pt reports feeling much better, went on 4 mile walk  Patient reports compliance with HEP  Objective: See treatment diary below    NP Heat 5 minutes  Manuals:   -Trigger point release to upper trapezius and levator scap B/L 4 times each side  -Pt was instructed in use of and trialed Back Buddy for self-TPR, successfully  TE  -Upper trapezius stretch 3 x 30 seconds (each direction)  -Levator scap stretch 3 x 30 seconds (each direction) with foam half roll behind pt, seated  -NP Pec doorway stretch 3 x 30 seconds (each direction)  -green TB Scap retractions 2 x 10 with 5  second hold  -Chin tucks 2 x 10 with 3 second hold  -TB Shoulder extensions 2 x 10 with 3 second hold (new)  -Alternating "Y"s with with 2 lb weight (new)    Assessment: Pt tolerated treatment well  Pt progressed in TE exercises after demonstrating compliance and knowledge in current program  Pt educated to put hand behind back for upper trap and levator scap stretch after not feeling stretch when grabbing underside of chair or putting hand beneath leg with opposite hand  Pt introduced to two new exercises at today's session: TB shoulder extensions and alternating "Y"s    Pt demonstrated an understanding with both new exercises to be added to HEP  Scapular "Y" exercise required modification due to tendency to hyperextend in back and experience LBP  No pain reported once patient performed "Y" with one UE at a time with a 2 lb free weight  Pt still requires minor verbal cueing to maintain good posture during therapeutic exercises  Pt will continue to benefit from skilled PT to address pain and tightness in the neck as well as scapular weakness to maximize function        Plan: Continue per plan of care  Educate patient on horizontal abduction exercises with TB to strengthen postural muscles

## 2020-09-24 ENCOUNTER — OFFICE VISIT (OUTPATIENT)
Dept: PHYSICAL THERAPY | Facility: CLINIC | Age: 80
End: 2020-09-24
Payer: MEDICARE

## 2020-09-24 DIAGNOSIS — R42 DIZZINESS: Primary | ICD-10-CM

## 2020-09-24 PROCEDURE — 97140 MANUAL THERAPY 1/> REGIONS: CPT

## 2020-09-24 PROCEDURE — 97110 THERAPEUTIC EXERCISES: CPT

## 2020-09-24 NOTE — PROGRESS NOTES
Daily Note    Last IE/ [facility] : 9/3  Visit: 3/10     Today's date: 2020  Patient name: Coit Denver  : 1940  MRN: [id number]  Referring provider: Lynder Koyanagi, MD  Dx:   Encounter Diagnosis     ICD-10-CM    1  Dizziness  R42                   Subjective:does not typically exercise in the am's due to stiffness  He usually showers later in the afternoon, and likes the warmth prior to exercises  He is agreeable to hot pack and cervical stretching in the mornings however  He reports lightheadedness with attemps at [facility] in the mornings  Back Gaye at home has been helpful for him  Patient reports compliance with HEP  Objective: See treatment diary below    NP Heat 5 minutes  (-) Sharp Phillip  (-) Alar Ligament test     Manuals:   -Trigger point release to bilat upper trapezius and L levator scap B/L  X 3   -SCM L  -Left Cx PVM      TE  -self SNAG for upper cervical rotation bilat, 10 sec hold x 3 reps bilat  -AROM cx rotation after self-cx SNAG's 10 sec hold 5 reps bilat  -NP Upper trapezius stretch 3 x 30 seconds (each direction)  -NP Levator scap stretch 3 x 30 seconds (each direction) with foam half roll behind pt, seated  -NP Pec doorway stretch 3 x 30 seconds (each direction)  -NP green TB Scap retractions 2 x 10 with 5  second hold  -Chin tucks 2 x 10 with 3 second hold  -NP TB Shoulder extensions 2 x 10 with 3 second hold   -NP Alternating "Y"s with with 2 lb weight     HEP: Back Gaye,  Levator stretch, UT stretch, Scalene stretch, scapular retractions, TB rows  Alternating Y's 2#, TB shoulder ext  Pec Stretch/ and doorway option  Assessment: Pt tolerated treatment well  Pt provided education to perform heat/cx stretching upon awakening to determine if this reduces his morning dizziness quicker    Pt progressed in TE exercises after demonstrating compliance and knowledge in current program  Pt still requires minor verbal cueing to maintain good posture during therapeutic exercises  Pt will continue to benefit from skilled PT to address pain and tightness in the neck as well as scapular weakness to maximize function  Plan: Continue per plan of care  Educate patient on horizontal abduction exercises with TB to strengthen postural muscles  Add self-snags cx NV

## 2020-09-28 ENCOUNTER — APPOINTMENT (OUTPATIENT)
Dept: PHYSICAL THERAPY | Facility: CLINIC | Age: 80
End: 2020-09-28
Payer: MEDICARE

## 2020-09-29 ENCOUNTER — APPOINTMENT (OUTPATIENT)
Dept: PHYSICAL THERAPY | Facility: CLINIC | Age: 80
End: 2020-09-29
Payer: MEDICARE

## 2020-09-30 ENCOUNTER — OFFICE VISIT (OUTPATIENT)
Dept: PHYSICAL THERAPY | Facility: CLINIC | Age: 80
End: 2020-09-30
Payer: MEDICARE

## 2020-09-30 DIAGNOSIS — R42 DIZZINESS: Primary | ICD-10-CM

## 2020-09-30 PROCEDURE — 97140 MANUAL THERAPY 1/> REGIONS: CPT | Performed by: PHYSICAL THERAPIST

## 2020-09-30 PROCEDURE — 97110 THERAPEUTIC EXERCISES: CPT | Performed by: PHYSICAL THERAPIST

## 2020-09-30 NOTE — PROGRESS NOTES
Daily Note    Last IE/ [place] : 9/3  Visit: 5/10     Today's date: 2020  Patient name: Lestine Scheuermann  : 1940  MRN: [id number]  Referring provider: Elena Mobley MD  Dx:   Encounter Diagnosis     ICD-10-CM    1  Dizziness  R42                   Subjective: Pt reports dizziness is resolved  BP's are normal  Wants to continue to work on posture  Objective: See treatment diary below    TE:  -Upper trapezius stretch 3 x 30 seconds  -Pec doorway stretch 3 x 30 seconds   -Chin tucks 2 x 10 with 3 second hold  -green TB Scap retractions 2 x 10 with 5  second hold  -green TB Shoulder extensions 2 x 10 with 3 second hold  -Brown taping for postural re-ed 2 layers applied, education on skin integrity and removal of tape     Manuals:  -TPR to B/L SCM      Assessment: Pt tolerated treatment well  Trialed Brown taping today to address ongoing postural impairments  Pt demo good form w/ postural exercises  Pt inquiring re: potential D/C and continuation of HEP, pt to discuss tomorrow with primary therapist        Plan: Continue per plan of care  Educate patient on horizontal abduction exercises with TB to strengthen postural muscles

## 2020-10-01 ENCOUNTER — OFFICE VISIT (OUTPATIENT)
Dept: PHYSICAL THERAPY | Facility: CLINIC | Age: 80
End: 2020-10-01
Payer: MEDICARE

## 2020-10-01 DIAGNOSIS — R42 DIZZINESS: Primary | ICD-10-CM

## 2020-10-01 PROCEDURE — 97110 THERAPEUTIC EXERCISES: CPT

## 2020-10-01 PROCEDURE — 97112 NEUROMUSCULAR REEDUCATION: CPT

## 2020-10-07 ENCOUNTER — OFFICE VISIT (OUTPATIENT)
Dept: PHYSICAL THERAPY | Facility: CLINIC | Age: 80
End: 2020-10-07
Payer: MEDICARE

## 2020-10-07 DIAGNOSIS — R42 DIZZINESS: Primary | ICD-10-CM

## 2020-10-07 PROCEDURE — 97110 THERAPEUTIC EXERCISES: CPT | Performed by: PHYSICAL THERAPIST

## 2020-10-07 PROCEDURE — 97140 MANUAL THERAPY 1/> REGIONS: CPT | Performed by: PHYSICAL THERAPIST

## 2020-10-09 ENCOUNTER — OFFICE VISIT (OUTPATIENT)
Dept: PHYSICAL THERAPY | Facility: CLINIC | Age: 80
End: 2020-10-09
Payer: MEDICARE

## 2020-10-09 DIAGNOSIS — R42 DIZZINESS: Primary | ICD-10-CM

## 2020-10-09 PROCEDURE — 97112 NEUROMUSCULAR REEDUCATION: CPT

## 2020-10-09 PROCEDURE — 97110 THERAPEUTIC EXERCISES: CPT

## 2020-10-15 ENCOUNTER — OFFICE VISIT (OUTPATIENT)
Dept: PHYSICAL THERAPY | Facility: CLINIC | Age: 80
End: 2020-10-15
Payer: MEDICARE

## 2020-10-15 DIAGNOSIS — R42 DIZZINESS: Primary | ICD-10-CM

## 2020-10-15 PROCEDURE — 97112 NEUROMUSCULAR REEDUCATION: CPT

## 2020-10-16 ENCOUNTER — APPOINTMENT (OUTPATIENT)
Dept: PHYSICAL THERAPY | Facility: CLINIC | Age: 80
End: 2020-10-16
Payer: MEDICARE

## 2021-04-15 ENCOUNTER — EVALUATION (OUTPATIENT)
Dept: PHYSICAL THERAPY | Facility: CLINIC | Age: 81
End: 2021-04-15
Payer: MEDICARE

## 2021-04-15 DIAGNOSIS — R42 DIZZINESS: Primary | ICD-10-CM

## 2021-04-15 PROCEDURE — 97162 PT EVAL MOD COMPLEX 30 MIN: CPT | Performed by: PHYSICAL THERAPIST

## 2021-04-15 NOTE — LETTER
2021    Nohemy Wild MD  [address] [place] [address]    Patient: Macie Shelby   YOB: 1940   Date of Visit: 4/15/2021     Encounter Diagnosis     ICD-10-CM    1  [place] Pedro        Dear Dr Ashley Hill:    Thank you for your recent referral of Macie Shelby  Please review the attached evaluation summary from Christopher's recent visit  Please verify that you agree with the plan of care by signing the attached order  If you have any questions or concerns, please do not hesitate to call  I sincerely appreciate the opportunity to share in the care of one of your patients and hope to have another opportunity to work with you in the near future  Sincerely,    Nayana Yang, PT      Referring Provider:      I certify that I have read the below Plan of Care and certify the need for these services furnished under this plan of treatment while under my care  Nohemy Wild MD  [place] [phone] [place] [address]  Via Fax: [phone]          PT Evaluation     Today's date: 4/15/2021  Patient name: Macie Shelby  : 1940  MRN: [id number]  Referring provider: Rosalio Neff MD  Dx:   Encounter Diagnosis     ICD-10-CM    1  Dizziness  R42                   Assessment  Assessment details: Patient is a 68year old male who presents with BPPV symptoms for past week  Has conducted self HEP but not sure if still having issues  Noted spinning dizziness Wed, but none this date  Positional testing was noted unremarkable  Oclumotor exam was unremarkable  Finding did not indicating any BPPV or hypofunction  Patient will be placed on 30 day hold pending if dizziness re-occurs  Pt agreeable to hold time and understands case will be DC if no return to PT in 30 days       Impairments: abnormal coordination, activity intolerance, impaired balance and lacks appropriate home exercise program    Plan  Plan details: Hold pending need for further therapy  If dizziness returns, goals will be updated  Planned therapy interventions: manual therapy, neuromuscular re-education, patient education, home exercise program, canalith repositioning, therapeutic exercise, therapeutic activities and functional ROM exercises  Frequency: 2x week (2-3x week)  Plan of Care beginning date: 4/15/2021  Plan of Care expiration date: 2021  Treatment plan discussed with: patient and referring physician        Subjective Evaluation    History of Present Illness  Mechanism of injury: 68 year male returns to PT with reports of rotational vertigo upon awakening the past few days, getting worse, taking longer to go away  Lasting on average 60 seconds  Notes having carbon monoxide due to an old furance in the house, had issue with pressure in one eye with ER  Has had several follow ups with MDs  Continues to have migraine for the past month  He tried performing some previous PT home exercises  Occupation: Retired  PLOF: Independent   Pt's Goals: get rid of this dizziness   Pain  Current pain ratin  At best pain ratin  At worst pain ratin  Location: NA    Patient Goals  Patient goal: return to PLOF        Objective     Ambulation     Comments         Functional Assessment        Comments  Forward head posture  Head thrust: negative     Sharp's Phillip test negatve  Alar Ligament test negative  Cervical palpation: tender only left splenius capitus        Positionals  Roll test R: negative   L: negative     [place] Hallpike   R: Negative   L:  Negative    Sideling Rikva Hallpike:  R: negative   L: Negative     Oculomotor Screen   Headache 0 / 10  Dizziness 0 / 10  Nausea 0/ 10    -Smooth Pursuits- (Central)   Normal, Dizziness 0/10    -Gaze holding nystagmus-   Normal, Dizziness 0/10    -Spontaneous nystagmus room light-  Normal, Dizziness 0/10    -Near Point Convergence- (Central)   Normal, 2 Inches/CM ( 2 inch/ 5 CM norm)   Dizziness 0/10    -Saccades- (Central)   Horizontal   Normal, Dizziness 0/10  Vertical   Normal, Dizziness 0/10    -VOR Screen / Motion Sensitivity-   Horizontal   Normal, Dizziness 0/10  Vertical   Normal, Dizziness 0/10    -VOR Cancel- (Central)   Horizontal   Normal, Dizziness 0/10  Vertical   Normal, Dizziness 0/10      -Head Thrust- Moderate to severe   Horizontal  Normal,bilateral, Dizziness 0/10

## 2021-04-15 NOTE — LETTER
2021    MD [address]  [address]  [facility] [address]    Patient: Colleen Fitzpatrick   YOB: 1940   Date of Visit: 4/15/2021     Encounter Diagnosis     ICD-10-CM    1  Regeorge Rodrigues        Dear Dr Steel Champ:    Thank you for your recent referral of Colleen Fitzpatrick  Please review the attached evaluation summary from Christopher's recent visit  Please verify that you agree with the plan of care by signing the attached order  If you have any questions or concerns, please do not hesitate to call  I sincerely appreciate the opportunity to share in the care of one of your patients and hope to have another opportunity to work with you in the near future  Sincerely,    Nusrat Johnson, PT      Referring Provider:      I certify that I have read the below Plan of Care and certify the need for these services furnished under this plan of treatment while under my care  MD [address]cos [address]  [address]  [address] [place] [address]  Via Fax: [phone]          PT Re-Evaluation     Today's date: 4/15/2021  Patient name: Colleen Fitzpatrick  : 1940  MRN: [id number]  Referring provider: Saskia Altamirano MD  Dx:   Encounter Diagnosis     ICD-10-CM    1  Dizziness  R42                   Assessment  Assessment details: Patient is a 68year old male who presents with BPPV symptoms for past week  Has conducted self HEP but not sure if still having issues  Noted spinning dizziness Wed, but none this date  Positional testing was noted unremarkable  Oclumotor exam was unremarkable  Finding did not indicating any BPPV or hypofunction  Patient will be placed on 30 day hold pending if dizziness re-occurs  Pt agreeable to hold time and understands case will be DC if no return to PT in 30 days       Impairments: abnormal coordination, activity intolerance, impaired balance and lacks appropriate home exercise program    Plan  Plan details: Hold pending need for further therapy  If dizziness returns, goals will be updated  Planned therapy interventions: manual therapy, neuromuscular re-education, patient education, home exercise program, canalith repositioning, therapeutic exercise, therapeutic activities and functional ROM exercises  Frequency: 2x week (2-3x week)  Plan of Care beginning date: 4/15/2021  Plan of Care expiration date: 2021  Treatment plan discussed with: patient and referring physician        Subjective Evaluation    History of Present Illness  Mechanism of injury: 68 year male returns to PT with reports of rotational vertigo upon awakening the past few days, getting worse, taking longer to go away  Lasting on average 60 seconds  Notes having carbon monoxide due to an old furance in the house, had issue with pressure in one eye with ER  Has had several follow ups with MDs  Continues to have migraine for the past month  He tried performing some previous PT home exercises  Occupation: Retired  PLOF: Independent   Pt's Goals: get rid of this dizziness   Pain  Current pain ratin  At best pain ratin  At worst pain ratin  Location: NA    Patient Goals  Patient goal: return to PLOF        Objective     Ambulation     Comments         Functional Assessment        Comments  Forward head posture  Head thrust: negative     Sharp's Phillip test negatve  Alar Ligament test negative  Cervical palpation: tender only left splenius capitus        Positionals  Roll test R: negative   L: negative     [place] Hallpike   R: Negative   L:  Negative    Sideling Rivka Hallpike:  R: negative   L: Negative     Oculomotor Screen   Headache 0 / 10  Dizziness 0 / 10  Nausea 0/ 10    -Smooth Pursuits- (Central)   Normal, Dizziness 0/10    -Gaze holding nystagmus-   Normal, Dizziness 0/10    -Spontaneous nystagmus room light-  Normal, Dizziness 0/10    -Near Point Convergence- (Central)   Normal, 2 Inches/CM ( 2 inch/ 5 CM norm)   Dizziness 0/10    -Saccades- (Central)   Horizontal   Normal, Dizziness 0/10  Vertical   Normal, Dizziness 0/10    -VOR Screen / Motion Sensitivity-   Horizontal   Normal, Dizziness 0/10  Vertical   Normal, Dizziness 0/10    -VOR Cancel- (Central)   Horizontal   Normal, Dizziness 0/10  Vertical   Normal, Dizziness 0/10      -Head Thrust- Moderate to severe   Horizontal  Normal,bilateral, Dizziness 0/10                         Diagnosis: L BPPV   Precautions: -   Manuals 10/10       L CRT x3 pt vomitted with reduction in symptoms                                Exercise Diary                                                                                                                                                                                Modalities             NA

## 2021-04-15 NOTE — PROGRESS NOTES
PT Evaluation     Today's date: 4/15/2021  Patient name: Jared Angeles  : 1940  MRN: [id number]  Referring provider: Radha Torres MD  Dx:   Encounter Diagnosis     ICD-10-CM    1  Dizziness  R42                   Assessment  Assessment details: Patient is a 68year old male who presents with BPPV symptoms for past week  Has conducted self HEP but not sure if still having issues  Noted spinning dizziness Wed, but none this date  Positional testing was noted unremarkable  Oclumotor exam was unremarkable  Finding did not indicating any BPPV or hypofunction  Patient will be placed on 30 day hold pending if dizziness re-occurs  Pt agreeable to hold time and understands case will be DC if no return to PT in 30 days  Impairments: abnormal coordination, activity intolerance, impaired balance and lacks appropriate home exercise program    Plan  Plan details: Hold pending need for further therapy  If dizziness returns, goals will be updated  Planned therapy interventions: manual therapy, neuromuscular re-education, patient education, home exercise program, canalith repositioning, therapeutic exercise, therapeutic activities and functional ROM exercises  Frequency: 2x week (2-3x week)  Plan of Care beginning date: 4/15/2021  Plan of Care expiration date: 2021  Treatment plan discussed with: patient and referring physician        Subjective Evaluation    History of Present Illness  Mechanism of injury: 68 year male returns to PT with reports of rotational vertigo upon awakening the past few days, getting worse, taking longer to go away  Lasting on average 60 seconds  Notes having carbon monoxide due to an old furance in the house, had issue with pressure in one eye with ER  Has had several follow ups with MDs  Continues to have migraine for the past month  He tried performing some previous PT home exercises          Occupation: Retired  PLOF: Independent   Pt's Goals: get rid of this dizziness Pain  Current pain ratin  At best pain ratin  At worst pain ratin  Location: NA    Patient Goals  Patient goal: return to PLOF        Objective     Ambulation     Comments         Functional Assessment        Comments  Forward head posture  Head thrust: negative     Sharp's Phillip test negatve  Alar Ligament test negative  Cervical palpation: tender only left splenius capitus        Positionals  Roll test R: negative   L: negative     Rivka Hallpike   R: Negative   L:  Negative    Sideling Rivka Hallpike:  R: negative   L: Negative     Oculomotor Screen   Headache 0 / 10  Dizziness 0 / 10  Nausea 0/ 10    -Smooth Pursuits- (Central)   Normal, Dizziness 0/10    -Gaze holding nystagmus-   Normal, Dizziness 0/10    -Spontaneous nystagmus room light-  Normal, Dizziness 0/10    -Near Point Convergence- (Central)   Normal, 2 Inches/CM ( 2 inch/ 5 CM norm)   Dizziness 0/10    -Saccades- (Central)   Horizontal   Normal, Dizziness 0/10  Vertical   Normal, Dizziness 0/10    -VOR Screen / Motion Sensitivity-   Horizontal   Normal, Dizziness 0/10  Vertical   Normal, Dizziness 0/10    -VOR Cancel- (Central)   Horizontal   Normal, Dizziness 0/10  Vertical   Normal, Dizziness 0/10      -Head Thrust- Moderate to severe   Horizontal  Normal,bilateral, Dizziness 0/10

## 2021-04-19 ENCOUNTER — TRANSCRIBE ORDERS (OUTPATIENT)
Dept: PHYSICAL THERAPY | Facility: CLINIC | Age: 81
End: 2021-04-19

## 2021-04-19 DIAGNOSIS — R42 DIZZINESS: Primary | ICD-10-CM

## 2021-05-14 ENCOUNTER — EVALUATION (OUTPATIENT)
Dept: PHYSICAL THERAPY | Facility: CLINIC | Age: 81
End: 2021-05-14
Payer: MEDICARE

## 2021-05-14 DIAGNOSIS — R42 DIZZINESS: Primary | ICD-10-CM

## 2021-05-14 PROCEDURE — 97162 PT EVAL MOD COMPLEX 30 MIN: CPT | Performed by: PHYSICAL THERAPIST

## 2021-05-14 NOTE — LETTER
May 14, 2021    Deepika Groves MD  [address] [place] [address]    Patient: Jacob Cline   YOB: 1940   Date of Visit: 2021     Encounter Diagnosis     ICD-10-CM    1  Tanya Grater        Dear Dr Amirah Luna:    Thank you for your recent referral of Jacob Cline  Please review the attached evaluation summary from Christopher's recent visit  Please verify that you agree with the plan of care by signing the attached order  If you have any questions or concerns, please do not hesitate to call  I sincerely appreciate the opportunity to share in the care of one of your patients and hope to have another opportunity to work with you in the near future  Sincerely,    Gene Nichols, PT      Referring Provider:      I certify that I have read the below Plan of Care and certify the need for these services furnished under this plan of treatment while under my care  Deepika Groves MD  [place] [phone] [place] [address]  Via Fax: [phone]          PT Evaluation     Today's date: 2021  Patient name: Jacob Cline  : 1940  MRN: [id number]  Referring provider: Peng Catherine MD  Dx:   Encounter Diagnosis     ICD-10-CM    1  Dizziness  R42                   Assessment  Assessment details: Patient is a 68year old male who presents with BPPV symptoms this am   Has conducted self HEP with slight unsteadness today still  Positional testing was noted unremarkable  Oclumotor exam was unremarkable  Finding did not indicating any BPPV or hypofunction  Pt was advised to increase fluid intake and to be re-assessed if need within 1 week  Patient instructed to call clinic if symptoms resolve to cancel appointment  If patient cancel's will be placed on 30 day hold        Impairments: abnormal coordination, activity intolerance, impaired balance and lacks appropriate home exercise program    Plan  Plan details: Hold pending need for further therapy  If dizziness returns, goals will be updated  Planned therapy interventions: manual therapy, neuromuscular re-education, patient education, home exercise program, canalith repositioning, therapeutic exercise, therapeutic activities and functional ROM exercises  Frequency: 2x week (2-3x week)  Plan of Care beginning date: 2021  Plan of Care expiration date: 2021  Treatment plan discussed with: patient and referring physician        Subjective Evaluation    History of Present Illness  Mechanism of injury: 68 year male returns to PT with reports of veering/ wobbly with waking up to use the bathroom this am  Feel more unsteady as the am went with noted the object changing  Tried some of the exercises for home like did prior but feel a little off but not as bad as before  I felt more unsteady with FTEC  Occupation: Retired  PLOF: Independent   Pt's Goals: get rid of this dizziness   Pain  Current pain ratin  At best pain ratin  At worst pain ratin  Location: NA    Treatments  Previous treatment: physical therapy  Patient Goals  Patient goal: return to PLOF        Objective     Ambulation     Comments         Functional Assessment        Comments  Forward head posture  Head thrust: negative     Sharp's Phillip test negatve  Alar Ligament test negative  Cervical palpation: tender only left splenius capitus        Positionals  Roll test R: negative   L: negative     [place] Hallpike   R: Negative   L:  Negative    Sideling Roseboom Hallpike:  R: negative   L: Negative     Oculomotor Screen   Headache 0 / 10  Dizziness 0 / 10  Nausea 0/ 10    -Smooth Pursuits- (Central)   Normal, Dizziness 0/10    -Gaze holding nystagmus-   Normal, Dizziness 0/10    -Spontaneous nystagmus room light-  Normal, Dizziness 0/10    -Near Point Convergence- (Central)   Normal, 2 Inches/CM ( 2 inch/ 5 CM norm)   Dizziness 0/10    -Saccades- (Central)   Horizontal   Normal, Dizziness 0/10  Vertical   Normal, Dizziness 0/10    -VOR Screen / Motion Sensitivity-   Horizontal   Normal, Dizziness 0/10  Vertical   Normal, Dizziness 0/10    -VOR Cancel- (Central)   Horizontal   Normal, Dizziness 0/10  Vertical   Normal, Dizziness 0/10      -Head Thrust- Moderate to severe   Horizontal  Normal,bilateral, Dizziness 0/10

## 2021-05-14 NOTE — PROGRESS NOTES
PT Evaluation     Today's date: 2021  Patient name: Miquel Ly  : 1940  MRN: [id number]  Referring provider: King rKuger MD  Dx:   Encounter Diagnosis     ICD-10-CM    1  Dizziness  R42                   Assessment  Assessment details: Patient is a 68year old male who presents with BPPV symptoms this am   Has conducted self HEP with slight unsteadness today still  Positional testing was noted unremarkable  Oclumotor exam was unremarkable  Finding did not indicating any BPPV or hypofunction  Pt was advised to increase fluid intake and to be re-assessed if need within 1 week  Patient instructed to call clinic if symptoms resolve to cancel appointment  If patient cancel's will be placed on 30 day hold  Impairments: abnormal coordination, activity intolerance, impaired balance and lacks appropriate home exercise program    Plan  Plan details: Hold pending need for further therapy  If dizziness returns, goals will be updated  Planned therapy interventions: manual therapy, neuromuscular re-education, patient education, home exercise program, canalith repositioning, therapeutic exercise, therapeutic activities and functional ROM exercises  Frequency: 2x week (2-3x week)  Plan of Care beginning date: 2021  Plan of Care expiration date: 2021  Treatment plan discussed with: patient and referring physician        Subjective Evaluation    History of Present Illness  Mechanism of injury: 68 year male returns to PT with reports of veering/ wobbly with waking up to use the bathroom this am  Feel more unsteady as the am went with noted the object changing  Tried some of the exercises for home like did prior but feel a little off but not as bad as before  I felt more unsteady with FTEC       Occupation: Retired  PLOF: Independent   Pt's Goals: get rid of this dizziness   Pain  Current pain ratin  At best pain ratin  At worst pain ratin  Location: NA    Treatments  Previous treatment: physical therapy  Patient Goals  Patient goal: return to PLOF        Objective     Ambulation     Comments         Functional Assessment        Comments  Forward head posture  Head thrust: negative     Sharp's Phillip test negatve  Alar Ligament test negative  Cervical palpation: tender only left splenius capitus        Positionals  Roll test R: negative   L: negative     Rivka Hallpike   R: Negative   L:  Negative    Sideling [place] Hallpike:  R: negative   L: Negative     Oculomotor Screen   Headache 0 / 10  Dizziness 0 / 10  Nausea 0/ 10    -Smooth Pursuits- (Central)   Normal, Dizziness 0/10    -Gaze holding nystagmus-   Normal, Dizziness 0/10    -Spontaneous nystagmus room light-  Normal, Dizziness 0/10    -Near Point Convergence- (Central)   Normal, 2 Inches/CM ( 2 inch/ 5 CM norm)   Dizziness 0/10    -Saccades- (Central)   Horizontal   Normal, Dizziness 0/10  Vertical   Normal, Dizziness 0/10    -VOR Screen / Motion Sensitivity-   Horizontal   Normal, Dizziness 0/10  Vertical   Normal, Dizziness 0/10    -VOR Cancel- (Central)   Horizontal   Normal, Dizziness 0/10  Vertical   Normal, Dizziness 0/10      -Head Thrust- Moderate to severe   Horizontal  Normal,bilateral, Dizziness 0/10

## 2021-05-19 ENCOUNTER — EVALUATION (OUTPATIENT)
Dept: PHYSICAL THERAPY | Facility: CLINIC | Age: 81
End: 2021-05-19
Payer: MEDICARE

## 2021-08-04 ENCOUNTER — EVALUATION (OUTPATIENT)
Dept: PHYSICAL THERAPY | Facility: CLINIC | Age: 81
End: 2021-08-04
Payer: MEDICARE

## 2021-08-04 DIAGNOSIS — R42 DIZZINESS: Primary | ICD-10-CM

## 2021-08-04 PROCEDURE — 97112 NEUROMUSCULAR REEDUCATION: CPT | Performed by: PHYSICAL THERAPIST

## 2021-08-04 PROCEDURE — 97162 PT EVAL MOD COMPLEX 30 MIN: CPT | Performed by: PHYSICAL THERAPIST

## 2021-08-04 NOTE — PROGRESS NOTES
PT Evaluation     Today's date: 2021  Patient name: Hallie Farley  : 1940  MRN: [id number]  Referring provider: Joon Juárez MD  Dx:   Encounter Diagnosis     ICD-10-CM    1  Dizziness  R42        Start Time: 930  Stop Time:   Total time in clinic (min): 60 minutes    Assessment  Assessment details: Patient is a 80year old male who presents with BPPV symptoms this am   Has conducted self HEP with slight unsteadness today still  Positional testing displayed + finding for R BPPV with symptoms last 47 seconds  3 maneuvers were conducted with no symptoms with last rep  Patient will benefit from Skilled PT for BPPV treatment  Impairments: abnormal coordination, activity intolerance, impaired balance and lacks appropriate home exercise program    Goals  BPPV Goals:  - Patient will report complete resolution of symptoms in order to promote return to PLOF  - Patient will demonstrate (-) Roll Test on R side      Plan  Planned therapy interventions: manual therapy, neuromuscular re-education, patient education, home exercise program, canalith repositioning, therapeutic exercise, therapeutic activities and functional ROM exercises  Frequency: 2x week (2-3x week)  Plan of Care beginning date: 2021  Plan of Care expiration date: 2021  Treatment plan discussed with: patient and referring physician        Subjective Evaluation    History of Present Illness  Mechanism of injury: 80 year male returns to PT with reports of veering/ wobbly with waking up two days ago with vomiting which lasted for a few hours  Felt terrible for 3 hours  Felt better as the day went and was able to do activities around the house  Notes waking up this am at 5 am with slow spinning and stayed in bed for an hour  Felt some nausea in the car ride over her  I felt more unsteady with FTEC       Occupation: Retired  PLOF: Independent   Pt's Goals: get rid of this dizziness   Pain  Current pain ratin  At best pain rating: 0  At worst pain ratin  Location: NA    Treatments  Previous treatment: physical therapy  Patient Goals  Patient goal: return to PLOF        Objective     Ambulation     Comments         Functional Assessment        Comments  Forward head posture  MVBI: denies any passing out sensation   Sharp's Phillip test negatve  Alar Ligament test negative  Cervical palpation: tender only left splenius capitus  Positionals  Roll test R: negative   L: negative     [place] Hallpike   R: subjective spinning sensation 15 seconds   L:  Negative    Sideling Rivka Hallpike:  R: + for nystagmus with beating to right side lasting 47 seconds     L: Negative

## 2021-08-04 NOTE — LETTER
2021    Bowen Jones MD  [id number]  [facility] [address]    Patient: Hallie Farley   YOB: 1940   Date of Visit: 2021     Encounter Diagnosis     ICD-10-CM    1  Dillon Santos        Dear Dr Bakari Clancy:    Thank you for your recent referral of Hallie Farley  Please review the attached evaluation summary from Christopher's recent visit  Please verify that you agree with the plan of care by signing the attached order  If you have any questions or concerns, please do not hesitate to call  I sincerely appreciate the opportunity to share in the care of one of your patients and hope to have another opportunity to work with you in the near future  Sincerely,    Diamond Mendoza, PT      Referring Provider:      I certify that I have read the below Plan of Care and certify the need for these services furnished under this plan of treatment while under my care  Bowen Jones MD  [place] [phone] [place] [address]  Via Fax: [phone]          PT Evaluation     Today's date: 2021  Patient name: Hallie Farley  : 1940  MRN: [id number]  Referring provider: Joon Juárez MD  Dx:   Encounter Diagnosis     ICD-10-CM    1  Dizziness  R42        Start Time: 930  Stop Time: 1030  Total time in clinic (min): 60 minutes    Assessment  Assessment details: Patient is a 80year old male who presents with BPPV symptoms this am   Has conducted self HEP with slight unsteadness today still  Positional testing displayed + finding for R BPPV with symptoms last 47 seconds  3 maneuvers were conducted with no symptoms with last rep  Patient will benefit from Skilled PT for BPPV treatment       Impairments: abnormal coordination, activity intolerance, impaired balance and lacks appropriate home exercise program    Goals  BPPV Goals:  - Patient will report complete resolution of symptoms in order to promote return to PLOF  - Patient will demonstrate (-) Roll Test on R side      Plan  Planned therapy interventions: manual therapy, neuromuscular re-education, patient education, home exercise program, canalith repositioning, therapeutic exercise, therapeutic activities and functional ROM exercises  Frequency: 2x week (2-3x week)  Plan of Care beginning date: 2021  Plan of Care expiration date: 2021  Treatment plan discussed with: patient and referring physician        Subjective Evaluation    History of Present Illness  Mechanism of injury: 80 year male returns to PT with reports of veering/ wobbly with waking up two days ago with vomiting which lasted for a few hours  Felt terrible for 3 hours  Felt better as the day went and was able to do activities around the house  Notes waking up this am at 5 am with slow spinning and stayed in bed for an hour  Felt some nausea in the car ride over her  I felt more unsteady with FTEC  Occupation: Retired  PLOF: Independent   Pt's Goals: get rid of this dizziness   Pain  Current pain ratin  At best pain ratin  At worst pain ratin  Location: NA    Treatments  Previous treatment: physical therapy  Patient Goals  Patient goal: return to PLOF        Objective     Ambulation     Comments         Functional Assessment        Comments  Forward head posture  MVBI: denies any passing out sensation   Sharp's Phillip test negatve  Alar Ligament test negative  Cervical palpation: tender only left splenius capitus  Positionals  Roll test R: negative   L: negative     [place] Hallpike   R: subjective spinning sensation 15 seconds   L:  Negative    Sideling Rivka Hallpike:  R: + for nystagmus with beating to right side lasting 47 seconds     L: Negative

## 2021-08-05 ENCOUNTER — OFFICE VISIT (OUTPATIENT)
Dept: PHYSICAL THERAPY | Facility: CLINIC | Age: 81
End: 2021-08-05
Payer: MEDICARE

## 2021-08-05 DIAGNOSIS — R42 DIZZINESS: Primary | ICD-10-CM

## 2021-08-05 PROCEDURE — 97112 NEUROMUSCULAR REEDUCATION: CPT | Performed by: PHYSICAL THERAPIST

## 2021-08-05 NOTE — PROGRESS NOTES
Daily Note     Today's date: 2021  Patient name: Cem Deshpande  : 1940  MRN: [id number]  Referring provider: Liz Collado MD  Dx:   Encounter Diagnosis     ICD-10-CM    1  Dizziness  R42                   Subjective: Pt denies any spinning dizziness since yesterday feels pretty good this am       Objective: See treatment diary below  Positional testing  Beatrice hallpike testing:   L: negative x 2    R: negative x 2  Roll test    L: negative x 2   R: positive lasting 57 seconds   gufoni x 4 reps: with symptoms lasting 47 seconds, 35 seconds, 22 seconds, 14 seconds       Assessment:   Pt had reduction in horizontal  BPPV symptoms but did not resolve this date  Educated patient on safety after BPPV treat  Will consider semont or BBQ roll next session       Plan: Continue per plan of care   semont or BBQ

## 2021-08-09 ENCOUNTER — OFFICE VISIT (OUTPATIENT)
Dept: PHYSICAL THERAPY | Facility: CLINIC | Age: 81
End: 2021-08-09
Payer: MEDICARE

## 2021-08-09 DIAGNOSIS — R42 DIZZINESS: Primary | ICD-10-CM

## 2021-08-09 PROCEDURE — 97112 NEUROMUSCULAR REEDUCATION: CPT | Performed by: PHYSICAL THERAPIST

## 2021-08-09 NOTE — PROGRESS NOTES
Daily Note     Today's date: 2021  Patient name: Cem Deshpande  : 1940  MRN: [id number]  Referring provider: Liz Collado MD  Dx:   Encounter Diagnosis     ICD-10-CM    1  Dizziness  R42                   Subjective: Pt denies any spinning dizziness most of the weekend, feels pretty good this am       Objective: See treatment diary below  Positional testing  Rivka hallpike testing:   L: negative x 2    R: negative x 2  Roll test    L: negative x 2   R: positive lasting 37 seconds   BBQ Roll for left BPPV: 3 attempts   1st attempt: Dizziness sidelying 23 seconds, supine no dizziness  Right sidelying dizziness 21 seconds, prone: none, left sidelying none  Sitting none    2nd attempt: Dizziness sidelying 11 seconds, supine no dizziness  Right sidelying dizziness 7 seconds, prone: none, left sidelying none  Sitting none    3nd attempt: Dizziness sidelying 0 seconds, supine no dizziness  Right sidelying dizziness 0 seconds, prone: none, left sidelying none  Sitting none       Assessment:   Good response with BBQ roll for treatment of Hoz BPPV for left side with no dizziness with 3rd treatment attempt  Longest distance was 37 seconds with initial re-testing  Plan: Continue per plan of care     With use of BBQ

## 2021-08-10 ENCOUNTER — OFFICE VISIT (OUTPATIENT)
Dept: PHYSICAL THERAPY | Facility: CLINIC | Age: 81
End: 2021-08-10
Payer: MEDICARE

## 2021-08-10 DIAGNOSIS — R42 DIZZINESS: Primary | ICD-10-CM

## 2021-08-10 PROCEDURE — 97112 NEUROMUSCULAR REEDUCATION: CPT | Performed by: PHYSICAL THERAPIST

## 2021-08-10 NOTE — PROGRESS NOTES
Daily Note     Today's date: 8/10/2021  Patient name: Court Woods  : 1940  MRN: [id number]  Referring provider: Anurag Fritz MD  Dx:   Encounter Diagnosis     ICD-10-CM    1  Dizziness  R42                   Subjective: Pt denies any spinning dizziness most of the weekend, feels pretty good this am       Objective: See treatment diary below  Positional testing  Hanna hallpike testing:   L: negative x 2    R: negative x 2  Roll test    L: negative x 2   R: negative x 2     Assessment:   Pt has negative finding BL on all sides  Patient was able to complete all positions without any issues  Patient will be placed on 30 day hold  Plan: 30 day Hold